# Patient Record
Sex: FEMALE | Race: WHITE
[De-identification: names, ages, dates, MRNs, and addresses within clinical notes are randomized per-mention and may not be internally consistent; named-entity substitution may affect disease eponyms.]

---
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
## 2024-12-13 ENCOUNTER — HOSPITAL ENCOUNTER (EMERGENCY)
Dept: HOSPITAL 33 - ED | Age: 24
Discharge: HOME | End: 2024-12-13
Payer: COMMERCIAL

## 2024-12-13 VITALS — SYSTOLIC BLOOD PRESSURE: 115 MMHG | DIASTOLIC BLOOD PRESSURE: 92 MMHG | OXYGEN SATURATION: 96 % | HEART RATE: 108 BPM

## 2024-12-13 VITALS — RESPIRATION RATE: 18 BRPM | TEMPERATURE: 98 F
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
## 2024-12-13 DIAGNOSIS — S61.511A: Primary | ICD-10-CM

## 2024-12-13 PROCEDURE — 12001 RPR S/N/AX/GEN/TRNK 2.5CM/<: CPT

## 2024-12-13 PROCEDURE — 99283 EMERGENCY DEPT VISIT LOW MDM: CPT

## 2024-12-13 RX ADMIN — BACITRACIN ZINC ONE EACH: 500 OINTMENT TOPICAL at 18:19

## 2024-12-13 NOTE — ERPHSYRPT
- History of Present Illness


Time Seen by Provider: 12/13/24 17:55


Source: patient


Exam Limitations: no limitations


Physician History: 





This is a 24-year-old white female patient who arrives by public transportation 

and is a patient of Dr. Jackson and is right-handed.  Patient fell after slipping 

on a plastic mat use for dog food bowl.  When she fell she hit the mat the mat 

broke in half and cut the dorsal aspect of her right wrist.  Patient tetanus 

status is up-to-date.  Patient has no known drug allergies and she takes no 

medications chronically.


Timing/Duration: today


Quality: painful


Severity: mild


Location: extremities (Right wrist dorsal aspect)


Associated Symptoms: denies symptoms


Allergies/Adverse Reactions: 








latex Allergy (Verified 12/13/24 18:01)


   Swelling





Home Medications: 








Norethindrone AC-Eth Estradiol [[place]ton 1.5 mg-30 Mcg Tablet] 1 each PO DAILY 

12/13/24 [History]


Paroxetine HCl 20 mg*** [Paxil 20 MG***] 20 mg PO DAILY 12/13/24 [History]





Hx Tetanus, Diphtheria Vaccination/Date Given: Yes


Hx Influenza Vaccination/Date Given: No


Hx Pneumococcal Vaccination/Date Given: No





Travel Risk





- International Travel


Have you traveled outside of the country in past 3 weeks: No





- Emerging Infectious Disease


Are you exhibiting symptoms associated with any current EIDs: No





- Review of Systems


Constitutional: No Symptoms


Eyes: No Symptoms


Ears, Nose, & Throat: No Symptoms


Respiratory: No Symptoms


Cardiac: No Symptoms


Abdominal/Gastrointestinal: No Symptoms


Genitourinary Symptoms: No Symptoms


Musculoskeletal: No Symptoms


Skin: Other (Right wrist skin laceration)


Neurological: No Symptoms


Psychological: No Symptoms


Endocrine: No Symptoms


Hematologic/Lymphatic: No Symptoms


Immunological/Allergic: No Symptoms


All Other Systems: Reviewed and Negative





- Past Medical History


Pertinent Past Medical History: No


Neurological History: Migraines


ENT History: No Pertinent History


Cardiac History: No Pertinent History


Respiratory History: No Pertinent History


Endocrine Medical History: No Pertinent History


Musculoskeletal History: No Pertinent History


GI Medical History: No Pertinent History


 History: No Pertinent History


Psycho-Social History: No Pertinent History


Female Reproductive Disorders: No Pertinent History





- Past Surgical History


Past Surgical History: Yes


Other Surgical History: TONSILECTOMY/ADNOIDECTOMY


Significant Family History: no pertinent family hx





- Female History


Hx Last Menstrual Period: 1ST OCT 2013





- Social History


Smoking Status: Never smoker


Exposure to second hand smoke: No


Drug Use: none


Patient Lives Alone: No





- Nursing Vital Signs


Nursing Vital Signs: 


                               Initial Vital Signs











Temperature  98 F   12/13/24 17:54


 


Pulse Rate  105 H  12/13/24 17:54


 


Respiratory Rate  18   12/13/24 17:54


 


Blood Pressure  147/124   12/13/24 17:54


 


O2 Sat by Pulse Oximetry  98   12/13/24 17:54








                                   Pain Scale











Pain Intensity                 5

















- Physical Exam


General Appearance: no apparent distress, alert, anxiety, obese


Eye Exam: PERRL/EOMI, eyes nml inspection


Ears, Nose, Throat Exam: normal ENT inspection


Neck Exam: normal inspection, non-tender, supple, full range of motion


Respiratory Exam: airway intact, No chest tenderness, No respiratory distress


Gastrointestinal/Abdomen Exam: No tenderness


Pelvic Exam: not done


Rectal Exam: not done


Back Exam: normal inspection, normal range of motion, No CVA tenderness, No 

vertebral tenderness


Extremity Exam: normal range of motion, pelvis stable, lacerations (1 cm skin 

laceration dorsal aspect right wrist.  No active bleeding.  No evidence of 

foreign body)


Neurologic Exam: alert, oriented x 3, cooperative, CNs II-XII nml as tested, nml

 cerebellar function, nml station & gait, sensation nml


Skin Exam: normal color, warm, dry, laceration (See above extremity section)


Lymphatic Exam: No adenopathy


**SpO2 Interpretation**: normal


O2 Delivery: Room Air





Procedures





- Laceration/Wound Repair


  ** Right Dorsal Wrist


Time of Procedure: 18:15


Wound Location: Right, wrist (Dorsal aspect)


Wound Length (cm): 1


Wound's Depth, Shape: superficial, linear


Wound Explored: clean (Wound explored to the base.  No active bleeding.  No 

foreign body noted)


Irrigated: Yes


Hibiclens Prep: Yes


Wound Repaired With: Staples


Progress: 





12/13/24 18:31


Patient tolerated the procedure well.  After laceration repair, the site was 

again cleaned with Hibiclens solution, dried and a thin layer of bacitracin 

ointment was applied.  A Band-Aid was placed overlying this repair site.





- Course


Nursing assessment & vital signs reviewed: Yes


Ordered Tests: 


                               Active Orders 24 hr











 Category Date Time Status


 


 Sutures STAT Care  12/13/24 18:18 Active


 


 Wound Care STAT Care  12/13/24 18:18 Active








Medication Summary














Discontinued Medications














Generic Name Dose Route Start Last Admin





  Trade Name Larry  PRN Reason Stop Dose Admin


 


Bacitracin Zinc  0.9 each  12/13/24 18:18  12/13/24 18:19





  Bacitracin Packet 1 Each Pckt  TP  12/13/24 18:19  0.9 each





  STAT ONE   Administration


 


Bacitracin Zinc  Confirm  12/13/24 18:16 





  Bacitracin Packet 1 Each Pckt  Administered  12/13/24 18:17 





  Dose  





  1 each  





  .ROUTE  





  .STK-MED ONE  














- Progress


Progress: improved


Progress Note: 





12/13/24 18:31


My medical decision making and the assignment of low complexity to this 

patient's medical issue today is based on review of the patient's past medical 

history, review of the patient's medication list, reviewed patient drug allergy 

list, history present illness and physical findings on examination.  The workup 

does not require any laboratory radiographic studies.





Differential diagnosis includes but is not limited to left skin laceration


Counseled pt/family regarding: diagnosis, need for follow-up





Medical Desision Making





- Diagnostic Testing


Diagnostic test were ordered, analyzed, and reviewed by me: No





- Risk of complications


Minimal Risk: Minimal risk of morbidity





- Departure


Departure Disposition: Home


Clinical Impression: 


 Laceration of skin of right wrist





Condition: Stable


Critical Care Time: No


Referrals: 


RADHA JACKSON MD [Primary Care Provider] - Follow up/PCP as directed


Additional Instructions: 


Use Tylenol and ibuprofen for pain control.  Keep the current bandage in place 

for 24 hours.  After 24 hours, remove the current bandage and rinse the site off

with soapy water.  Blot dry or use a hair dryer then apply a thin layer of 

antibiotic ointment of choice and again cover with Band-Aid/bandage.  Suture 

removal in 7 days. Is this okay to place?

## 2025-02-01 ENCOUNTER — HOSPITAL ENCOUNTER (EMERGENCY)
Dept: HOSPITAL 33 - ED | Age: 25
LOS: 1 days | Discharge: HOME | End: 2025-02-02
Payer: COMMERCIAL

## 2025-02-01 VITALS — TEMPERATURE: 96.8 F

## 2025-02-01 DIAGNOSIS — M54.50: ICD-10-CM

## 2025-02-01 DIAGNOSIS — R10.30: ICD-10-CM

## 2025-02-01 DIAGNOSIS — Z79.899: ICD-10-CM

## 2025-02-01 DIAGNOSIS — N13.2: Primary | ICD-10-CM

## 2025-02-01 DIAGNOSIS — Z79.891: ICD-10-CM

## 2025-02-01 DIAGNOSIS — N39.0: ICD-10-CM

## 2025-02-01 DIAGNOSIS — R11.0: ICD-10-CM

## 2025-02-01 LAB
ALBUMIN SERPL-MCNC: 4.9 G/DL (ref 3.5–5)
ALP SERPL-CCNC: 107 U/L (ref 38–126)
ALT SERPL-CCNC: 26 U/L (ref 0–35)
AMYLASE SERPL-CCNC: 66 U/L (ref 30–110)
ANION GAP SERPL CALC-SCNC: 16.1 MEQ/L (ref 5–15)
AST SERPL QL: 29 U/L (ref 14–36)
B-HCG SERPL QL: NEGATIVE
BASOPHILS # BLD AUTO: 0.08 X10^3/UL (ref 0.01–0.08)
BASOPHILS NFR BLD AUTO: 0.4 % (ref 0.1–1.2)
BILIRUB BLD-MCNC: 0.4 MG/DL (ref 0.2–1.3)
BUN SERPL-MCNC: 10 MG/DL (ref 7–17)
CALCIUM SPEC-MCNC: 9.6 MG/DL (ref 8.4–10.2)
CHLORIDE SERPL-SCNC: 107 MMOL/L (ref 98–107)
CO2 SERPL-SCNC: 19 MMOL/L (ref 22–30)
CREAT SERPL-MCNC: 0.78 MG/DL (ref 0.52–1.04)
EOSINOPHIL # BLD AUTO: 0.2 X10^3/UL (ref 0.04–0.36)
GFR SERPLBLD BASED ON 1.73 SQ M-ARVRAT: 108.7 ML/MIN
GLUCOSE SERPL-MCNC: 122 MG/DL (ref 74–106)
HCT VFR BLD AUTO: 42.7 % (ref 34.1–44.9)
HGB BLD-MCNC: 14.2 G/DL (ref 11.2–15.7)
IMM GRANULOCYTES # BLD: 0.1 X10^3U/L (ref 0–0.03)
IMM GRANULOCYTES NFR BLD: 0.5 % (ref 0–0.43)
LIPASE SERPL-CCNC: 48 U/L (ref 23–300)
LYMPHOCYTES # SPEC AUTO: 2.69 X10^3/UL (ref 1.18–3.74)
MCH RBC QN AUTO: 28.8 PG (ref 25.6–32.2)
MCHC RBC AUTO-ENTMCNC: 33.3 G/DL (ref 32.2–35.5)
MONOCYTES # BLD AUTO: 0.84 X10^3/UL (ref 0.24–0.86)
NRBC # BLD AUTO: 0 X10^3U/L (ref 0–0.01)
NRBC BLD AUTO-RTO: 0 % (ref 0–0.2)
PLATELET # BLD AUTO: 427 X10^3/UL (ref 182–369)
POTASSIUM SERPLBLD-SCNC: 4 MMOL/L (ref 3.5–5.1)
PROT SERPL-MCNC: 8.1 G/DL (ref 6.3–8.2)
PROT UR STRIP-MCNC: 30 MG/DL
RBC # BLD AUTO: 4.93 X10^6/UL (ref 3.93–5.22)
RBC # URNS HPF: (no result) /HPF (ref 0–5)
SODIUM SERPL-SCNC: 138 MMOL/L (ref 135–145)
WBC # BLD AUTO: 18.8 X10^3/UL (ref 3.98–10.04)

## 2025-02-01 PROCEDURE — 84703 CHORIONIC GONADOTROPIN ASSAY: CPT

## 2025-02-01 PROCEDURE — 99285 EMERGENCY DEPT VISIT HI MDM: CPT

## 2025-02-01 PROCEDURE — 76376 3D RENDER W/INTRP POSTPROCES: CPT

## 2025-02-01 PROCEDURE — 83690 ASSAY OF LIPASE: CPT

## 2025-02-01 PROCEDURE — 74176 CT ABD & PELVIS W/O CONTRAST: CPT

## 2025-02-01 PROCEDURE — 96376 TX/PRO/DX INJ SAME DRUG ADON: CPT

## 2025-02-01 PROCEDURE — 96375 TX/PRO/DX INJ NEW DRUG ADDON: CPT

## 2025-02-01 PROCEDURE — 85025 COMPLETE CBC W/AUTO DIFF WBC: CPT

## 2025-02-01 PROCEDURE — 81001 URINALYSIS AUTO W/SCOPE: CPT

## 2025-02-01 PROCEDURE — 99284 EMERGENCY DEPT VISIT MOD MDM: CPT

## 2025-02-01 PROCEDURE — 83605 ASSAY OF LACTIC ACID: CPT

## 2025-02-01 PROCEDURE — 80053 COMPREHEN METABOLIC PANEL: CPT

## 2025-02-01 PROCEDURE — 96374 THER/PROPH/DIAG INJ IV PUSH: CPT

## 2025-02-01 PROCEDURE — 36415 COLL VENOUS BLD VENIPUNCTURE: CPT

## 2025-02-01 PROCEDURE — 82150 ASSAY OF AMYLASE: CPT

## 2025-02-01 PROCEDURE — 87086 URINE CULTURE/COLONY COUNT: CPT

## 2025-02-01 RX ADMIN — CEFTRIAXONE SODIUM ONE MLS/HR: 1 INJECTION, POWDER, FOR SOLUTION INTRAMUSCULAR; INTRAVENOUS at 23:15

## 2025-02-01 RX ADMIN — HYDROMORPHONE HYDROCHLORIDE ONE MG: 1 INJECTION, SOLUTION INTRAMUSCULAR; INTRAVENOUS; SUBCUTANEOUS at 18:52

## 2025-02-01 RX ADMIN — HYDROMORPHONE HYDROCHLORIDE ONE MG: 1 INJECTION, SOLUTION INTRAMUSCULAR; INTRAVENOUS; SUBCUTANEOUS at 23:08

## 2025-02-01 RX ADMIN — ONDANSETRON ONE MG: 2 INJECTION, SOLUTION INTRAMUSCULAR; INTRAVENOUS at 18:51

## 2025-02-01 NOTE — ERPHSYRPT
- History of Present Illness


Time Seen by Provider: 02/01/25 18:13


Source: patient, family


Exam Limitations: no limitations


Physician History: 





Pt had onset of    9 out of 10 mid low lumbar and sacral pain with nausea and 

some radiation to low abd.  No Hx trauma, No blood thinners, some 

nausea/vomiting. No hx cancer no fever, no bowel or bladder symptoms although 

some blood with stools - no pain. No vag dc. 





Nontender abd without peritoneal signs. Chest clear. Ht reg without M. 


tender posterior sacrum midline and lower lumbar. 





  


Discussed with pt and available family risks and benefits of testing/Tx 

including  CBC, CMP, HCG,  UA, Amylase, Lipase,  CT abd and spinal 

reconstruction     ,   pain med dilaudid,  and they wish to proceed so these are

ordered.  








 Results discussed with pt and available family. 





Timing/Duration: today


Method of Injury: other (no injury reported)


Quality: sharp, stabbing


Back Pain Location: lumbar spine


Severity of Pain-Max: moderate


Severity of Pain-Current: moderate


Modifying Factors: Improves With: movement, pain medication


Associated Symptoms: nausea, vomiting, other (blood with bowel movements)


Previous symptoms: no prior history


Allergies/Adverse Reactions: 








latex Allergy (Verified 12/13/24 18:01)


   Swelling





Home Medications: 








Norethindrone AC-Eth Estradiol [Layton 1.5 mg-30 Mcg Tablet] 1 each PO DAILY 

12/13/24 [History]


Paroxetine HCl 20 mg*** [Paxil 20 MG***] 20 mg PO DAILY 12/13/24 [History]





Hx Tetanus, Diphtheria Vaccination/Date Given: Yes


Hx Influenza Vaccination/Date Given: No


Hx Pneumococcal Vaccination/Date Given: No





Travel Risk





- Emerging Infectious Disease


Are you exhibiting symptoms associated with any current EIDs: No





- Review of Systems


Constitutional: No Fever, No Chills


Eyes: No Symptoms


Ears, Nose, & Throat: No Symptoms


Respiratory: No Cough, No Dyspnea


Cardiac: No Chest Pain, No Edema, No Syncope


Abdominal/Gastrointestinal: Nausea, Vomiting, No Abdominal Pain, No Diarrhea


Genitourinary Symptoms: No Dysuria


Musculoskeletal: Back Pain, No Neck Pain, No Fall, No Injury


Skin: No Symptoms, No Rash


Neurological: No Dizziness, No Focal Weakness, No Sensory Changes


Psychological: No Symptoms


Endocrine: No Symptoms


Hematologic/Lymphatic: No Symptoms


Immunological/Allergic: No Symptoms


All Other Systems: Reviewed and Negative





- Past Medical History


Pertinent Past Medical History: No


Neurological History: Migraines


ENT History: No Pertinent History


Cardiac History: No Pertinent History


Respiratory History: No Pertinent History


Endocrine Medical History: No Pertinent History


Musculoskeletal History: No Pertinent History


GI Medical History: No Pertinent History


 History: No Pertinent History


Psycho-Social History: No Pertinent History


Female Reproductive Disorders: No Pertinent History


Other Medical History: UTI





- Past Surgical History


Past Surgical History: Yes


Other Surgical History: TONSILECTOMY/ADNOIDECTOMY


Significant Family History: no pertinent family hx





- Female History


Hx Last Menstrual Period: 1ST OCT 2013





- Social History


Smoking Status: Never smoker


Exposure to second hand smoke: No


Drug Use: none


Patient Lives Alone: No





- Social Determinants of Health


Will the patient participate in the screening: Declined to provide





- Nursing Vital Signs


Nursing Vital Signs: 


                               Initial Vital Signs











Temperature  96.8 F   02/01/25 18:20


 


Pulse Rate  116 H  02/01/25 18:20


 


Respiratory Rate  18   02/01/25 18:20


 


Blood Pressure  173/113   02/01/25 18:20


 


O2 Sat by Pulse Oximetry  99   02/01/25 18:20








                                   Pain Scale











Pain Intensity []              8


 


Pain Intensity                 5

















- Physical Exam


General Appearance: no apparent distress, alert


Eye Exam: PERRL/EOMI, eyes nml inspection


Neck Exam: normal inspection, non-tender, supple, full range of motion, No 

meningismus, No midline tenderness


Respiratory Exam: normal breath sounds, lungs clear, No chest tenderness, No 

respiratory distress


Cardiovascular Exam: regular rate/rhythm, normal heart sounds


Gastrointestinal Exam: soft, No tenderness, No distention, No mass, No guarding,

 No pulsatile mass, No rebound


Pelvic Exam: deferred


Rectal Exam: deferred


Back Exam: normal inspection, vertebral tenderness, point tenderness


Extremity Exam: normal inspection, normal range of motion, No calf tenderness, 

No pedal edema


Peripheral Pulses: carotid (R): 2+, carotid (L): 2+, femoral (R): 2+, femoral 

(L): 2+, dorsalis-pedis (R): 2+, dorsalis-pedis (L): 2+


Neurologic Exam: alert, oriented x 3, cooperative, CNs II-XII nml as tested, 

normal mood/affect, nml station & gait, sensation nml, No motor deficits


Skin Exam: normal color, warm, dry, No rash


**SpO2 Interpretation**: normal


SpO2: 99


O2 Delivery: Room Air





- Course


Nursing assessment & vital signs reviewed: Yes





- CT Exams


  ** Abdomen/Pelvis


CT Interpretation: Tele-radiologist Report, Other (left renal stone and mild hyd

ro. )


Ordered Tests: 


                               Active Orders 24 hr











 Category Date Time Status


 


 IV Insertion STAT Care  02/01/25 18:36 Active


 


 ABDOMEN AND PELVIS W/0 CONTRAS [CT] Stat Exams  02/01/25 18:35 Completed


 


 RECONSTRUCTION [CT] Stat Exams  02/01/25 19:08 Completed


 


 AMYLASE Stat Lab  02/01/25 18:45 Completed


 


 CBC W DIFF Stat Lab  02/01/25 18:45 Completed


 


 CMP Stat Lab  02/01/25 18:45 Completed


 


 CULTURE,URINE Stat Lab  02/01/25 19:01 Received


 


 HCG QUALITATIVE, SERUM Stat Lab  02/01/25 18:45 Completed


 


 LIPASE Stat Lab  02/01/25 18:45 Completed


 


 Lactic Acid Stat Lab  02/01/25 18:45 Completed


 


 UA W/RFX UR CULTURE Stat Lab  02/01/25 19:01 Completed








Medication Summary














Discontinued Medications














Generic Name Dose Route Start Last Admin





  Trade Name Larry  PRN Reason Stop Dose Admin


 


Hydromorphone HCl  1 mg  02/01/25 18:36  02/01/25 18:52





  Hydromorphone 1 Mg/1ml Inj***  IV  02/01/25 18:37  1 mg





  STAT ONE   Administration


 


Hydromorphone HCl  Confirm  02/01/25 18:47 





  Hydromorphone 1 Mg/1ml Inj***  Administered  02/01/25 18:48 





  Dose  





  1 mg  





  .ROUTE  





  .STK-MED ONE  


 


Hydromorphone HCl  1 mg  02/01/25 22:44  02/01/25 23:08





  Hydromorphone 1 Mg/1ml Inj***  IV  02/01/25 22:45  1 mg





  STAT ONE   Administration


 


Hydromorphone HCl  Confirm  02/01/25 23:05 





  Hydromorphone 1 Mg/1ml Inj***  Administered  02/01/25 23:06 





  Dose  





  1 mg  





  .ROUTE  





  .STK-MED ONE  


 


Ceftriaxone Sodium  1 gm in 100 mls @ 200 mls/hr  02/01/25 22:12  02/01/25 23:15





  Rocephin 1 Gm / 100 Ml Nacl  IV  02/01/25 22:41  200 mls/hr





  STAT ONE   200 mls/hr





    Administration


 


Ceftriaxone Sodium  Confirm  02/01/25 23:05 





  Rocephin 1 Gm / 100 Ml Nacl  Administered  02/01/25 23:06 





  Dose  





  1 gm in 100 mls @ ud  





  IV  





  .STK-MED ONE  


 


Ondansetron HCl  4 mg  02/01/25 18:36  02/01/25 18:51





  Ondansetron Hcl 4 Mg/2 Ml Vial  IV  02/01/25 18:37  4 mg





  STAT ONE   Administration


 


Ondansetron HCl  Confirm  02/01/25 18:47 





  Ondansetron Hcl 4 Mg/2 Ml Vial  Administered  02/01/25 18:48 





  Dose  





  4 mg  





  .ROUTE  





  .STK-MED ONE  











Lab/Rad Data: 


                           Laboratory Result Diagrams





                                 02/01/25 18:45 





                                 02/01/25 18:45 





                               Laboratory Results











  02/01/25 02/01/25 02/01/25 Range/Units





  19:01 18:45 18:45 


 


WBC     (3.98-10.04)  x10^3/uL


 


RBC     (3.93-5.22)  x10^6/uL


 


Hgb     (11.2-15.7)  g/dL


 


Hct     (34.1-44.9)  %


 


MCV     (79.4-94.8)  fL


 


MCH     (25.6-32.2)  pg


 


MCHC     (32.2-35.5)  g/dL


 


RDW     (11.7-14.4)  %


 


Plt Count     (182-369)  x10^3/uL


 


MPV     (9.4-12.3)  fL


 


Gran %     (34.0-71.1)  %


 


Immature Gran % (Auto)     (0.001-0.429)  %


 


Nucleat RBC Rel Count     (0.00-0.2)  %


 


Eos # (Auto)     (0.04-0.36)  x10^3/uL


 


Immature Gran # (Auto)     (0.001-0.031)  x10^3u/L


 


Absolute Lymphs (auto)     (1.18-3.74)  x10^3/uL


 


Absolute Monos (auto)     (0.24-0.86)  x10^3/uL


 


Absolute Nucleated RBC     (0.00-0.012)  x10^3u/L


 


Lymphocytes %     (19.3-51.7)  %


 


Monocytes %     (4.7-12.5)  %


 


Eosinophils %     (0.7-5.8)  %


 


Basophils %     (0.1-1.2)  %


 


Absolute Granulocytes     (1.56-6.13)  x10^3/uL


 


Basophils #     (0.01-0.08)  x10^3/uL


 


Sodium    138  (135-145)  mmol/L


 


Potassium    4.0  (3.5-5.1)  mmol/L


 


Chloride    107  ()  mmol/L


 


Carbon Dioxide    19 L  (22-30)  mmol/L


 


Anion Gap    16.1 H  (5-15)  MEQ/L


 


BUN    10  (7-17)  mg/dL


 


Creatinine    0.78  (0.52-1.04)  mg/dL


 


Estimated GFR    108.7  ML/MIN


 


Glucose    122 H  ()  mg/dL


 


Lactic Acid     (0.4-2.0)  


 


Calcium    9.6  (8.4-10.2)  mg/dL


 


Total Bilirubin    0.40  (0.2-1.3)  mg/dL


 


AST    29  (14-36)  U/L


 


ALT    26  (0-35)  U/L


 


Alkaline Phosphatase    107  ()  U/L


 


Serum Total Protein    8.1  (6.3-8.2)  g/dL


 


Albumin    4.9  (3.5-5.0)  g/dL


 


Amylase    66  ()  U/L


 


Lipase    48  ()  U/L


 


Serum HCG, Qual   NEGATIVE   (NEGATIVE)  


 


Urine Color  Yellow    (Yellow)  


 


Urine Appearance  Clear    (Clear)  


 


Urine pH  6.0    (4.6-8.0)  


 


Ur Specific Gravity  1.025    (1.005-1.030)  


 


Urine Protein  30    (Negative)  


 


Urine Glucose (UA)  Negative    (Negative)  mg/dL


 


Urine Ketones  15 A    (Negative)  


 


Urine Blood  Negative    (Negative)  


 


Urine Nitrite  Negative    (Negative)  


 


Urine Bilirubin  Negative    (Negative)  


 


Urine Urobilinogen  0.2    (0.2)  mg/dL


 


Ur Leukocyte Esterase  Negative    (Negative)  


 


U Hyaline Cast (Auto)  NONE SEEN    (0-2)  /LPF


 


Urine Microscopic RBC  3-5    (0-5)  /HPF


 


Urine Microscopic WBC  11-20 A    (0-5)  /HPF


 


Ur Epithelial Cells  Few    (None Seen)  /HPF


 


Urine Bacteria  Few A    (None Seen)  /HPF


 


Urine Culture Reflexed  YES    (NO)  














  02/01/25 02/01/25 Range/Units





  18:45 18:45 


 


WBC   18.8 H  (3.98-10.04)  x10^3/uL


 


RBC   4.93  (3.93-5.22)  x10^6/uL


 


Hgb   14.2  (11.2-15.7)  g/dL


 


Hct   42.7  (34.1-44.9)  %


 


MCV   86.6  (79.4-94.8)  fL


 


MCH   28.8  (25.6-32.2)  pg


 


MCHC   33.3  (32.2-35.5)  g/dL


 


RDW   13.7  (11.7-14.4)  %


 


Plt Count   427 H  (182-369)  x10^3/uL


 


MPV   9.6  (9.4-12.3)  fL


 


Gran %   79.2 H  (34.0-71.1)  %


 


Immature Gran % (Auto)   0.5 H  (0.001-0.429)  %


 


Nucleat RBC Rel Count   0.0  (0.00-0.2)  %


 


Eos # (Auto)   0.20  (0.04-0.36)  x10^3/uL


 


Immature Gran # (Auto)   0.10 H  (0.001-0.031)  x10^3u/L


 


Absolute Lymphs (auto)   2.69  (1.18-3.74)  x10^3/uL


 


Absolute Monos (auto)   0.84  (0.24-0.86)  x10^3/uL


 


Absolute Nucleated RBC   0.00  (0.00-0.012)  x10^3u/L


 


Lymphocytes %   14.3 L  (19.3-51.7)  %


 


Monocytes %   4.5 L  (4.7-12.5)  %


 


Eosinophils %   1.1  (0.7-5.8)  %


 


Basophils %   0.4  (0.1-1.2)  %


 


Absolute Granulocytes   14.84 H  (1.56-6.13)  x10^3/uL


 


Basophils #   0.08  (0.01-0.08)  x10^3/uL


 


Sodium    (135-145)  mmol/L


 


Potassium    (3.5-5.1)  mmol/L


 


Chloride    ()  mmol/L


 


Carbon Dioxide    (22-30)  mmol/L


 


Anion Gap    (5-15)  MEQ/L


 


BUN    (7-17)  mg/dL


 


Creatinine    (0.52-1.04)  mg/dL


 


Estimated GFR    ML/MIN


 


Glucose    ()  mg/dL


 


Lactic Acid  1.9   (0.4-2.0)  


 


Calcium    (8.4-10.2)  mg/dL


 


Total Bilirubin    (0.2-1.3)  mg/dL


 


AST    (14-36)  U/L


 


ALT    (0-35)  U/L


 


Alkaline Phosphatase    ()  U/L


 


Serum Total Protein    (6.3-8.2)  g/dL


 


Albumin    (3.5-5.0)  g/dL


 


Amylase    ()  U/L


 


Lipase    ()  U/L


 


Serum HCG, Qual    (NEGATIVE)  


 


Urine Color    (Yellow)  


 


Urine Appearance    (Clear)  


 


Urine pH    (4.6-8.0)  


 


Ur Specific Gravity    (1.005-1.030)  


 


Urine Protein    (Negative)  


 


Urine Glucose (UA)    (Negative)  mg/dL


 


Urine Ketones    (Negative)  


 


Urine Blood    (Negative)  


 


Urine Nitrite    (Negative)  


 


Urine Bilirubin    (Negative)  


 


Urine Urobilinogen    (0.2)  mg/dL


 


Ur Leukocyte Esterase    (Negative)  


 


U Hyaline Cast (Auto)    (0-2)  /LPF


 


Urine Microscopic RBC    (0-5)  /HPF


 


Urine Microscopic WBC    (0-5)  /HPF


 


Ur Epithelial Cells    (None Seen)  /HPF


 


Urine Bacteria    (None Seen)  /HPF


 


Urine Culture Reflexed    (NO)  














- Progress


Progress: improved, re-examined


Progress Note: 





02/01/25 23:47


pain down from 9 to 0 now.





I discussed with pt and mom that this pain may not necessarily be related to the

 stone, and that there could be additional pathology evolving undetected 

incuding infection, vascular, abdominal or other.  they are aware, and wish to 

have outpt f/u rather than furhter eval in ER or hospital and have the cqapcity 

to make this choice. 


Counseled pt/family regarding: lab results, diagnosis, need for follow-up, rad 

results





Medical Desision Making





- Independent Historian


Additional History obtained from: Family





- Discussion of managment


Reviewed:: Test results, Need for additional workup


Agreed on:: Treatment plan, need for follow-up





- Diagnostic Testing


Diagnostic test were ordered, analyzed, and reviewed by me: Yes


Radiological Interpretation: Teleradiologist Report





- Risk of complications


The pt has a mod risk of morbidity or mortality based on: Need for prescription 

drug management


The pt has a high risk of morbidity or mortality based on: Decision regarding 

hospitilization or escalation of hosp level of care





- Departure


Departure Disposition: Home


Clinical Impression: 


 Renal calculus, UTI (urinary tract infection)





Condition: Good


Critical Care Time: No


Referrals: 


RADHA JACKSON MD [Primary Care Provider] - Follow up/PCP as directed


Instructions:  Low Back Pain  (DC), Kidney stones in adults - ED discharge 

instructions, Urinary tract infection in adults - ED discharge instructions, 

Abdominal pain in adults - ED discharge instructions


Additional Instructions: 


Although there is a kidney stone which seems a likely source for your pain, we 

have not yet determined the exact cause, and there still could be other 

undetected conditions developing - so followup with your DrOphelia and referral to a 

urologist are important. Return meantime if not improving, vomiting, fever, 

dizziness or any othe4r symptoms of concern. 


also followup your blood pressure with your Dr. 


strain the urine for the stones.


Prescriptions: 


Hydrocodone/Acetaminophen [Hydrocodone-Acetamin 5-325 mg***] 1 tab PO Q6HPRN PRN

#14 tablet MDD 4


 PRN Reason: Pain


Cephalexin Mh 500 mg** [Keflex 500 mg**] 500 mg PO TID 7 Days #21 cap

## 2025-02-01 NOTE — XRAY
CLINICAL HISTORY: PAIN

COMPARISON: No prior studies are available for comparison.

TECHNIQUE: CT non-contrast scan of lumbar spine done. Axial images obtained

with reformatted coronal and sagittal images and submitted for interpretation.

One of the following dose reduction techniques were utilized for this exam:

Automated exposure control, adjustment of the mA and/or kV according to

patient size, use of iterative reconstruction.

FINDINGS:

Vertebrae:

 Straightening of the lumbar spine possibly due to muscular spasm.

No fractures, lytic or sclerotic lesions.

Normal bone density without evidence of osteopenia or osteoporosis.



Intervertebral Discs:

 Normal height and signal intensity of the intervertebral discs.

No evidence of disc herniation, bulging, or significant degeneration.



Spinal Canal and Neural Foramina:

 Spinal canal is of normal caliber with no evidence of spinal stenosis.

Neural foramina are patent bilaterally at all levels.

No evidence of nerve root compression.



Facet Joints:

 Normal appearance of the facet joints.

No evidence of facet arthropathy or significant degenerative changes.



Soft Tissues:

 Normal appearance of the paraspinal soft tissues.

No abnormal masses, fluid collections, or signs of inflammation.

 Left renal pelvic stone measuring about 1 cm with mild backpressure changes.

IMPRESSION:

1. Straightening of the lumbar spine possibly due to muscular spasm.

2. No fractures, dislocation, or significant degenerative changes.

3. Left renal pelvic stone measuring about 1 cm with mild backpressure

changes.



_____________________________________

Electronically Signed by: Ml Chi MD. (02/01/2025 21:46:05 EST)

## 2025-02-01 NOTE — XRAY
CLINICAL HISTORY: back and abd pain

COMPARISON: No prior studies available for comparison

TECHNIQUE: Non-contrast CT of the abdomen and pelvis was performed, with the

following protocol: axial images, and reconstructed coronal and sagittal

images. No intravenous contrast was administered. One of the following dose

reduction techniques was utilized for this exam: Automated exposure control,

adjustment of the mA and/or kV according to patient size, and use of iterative

reconstruction.

FINDINGS:

Abdomen:

 Liver:

 Enlarged in size measuring 19.7 cm in craniocaudal span with diffuse

hypodensity. No focal lesions, cysts, or masses were identified.



 Gallbladder and Biliary System:

 The gallbladder is normal in size and shape. No wall thickening,

pericholecystic fluid, or gallstones were identified.



 Pancreas:

 Pancreatic head, body, and tail are visualized and appear normal in size and

density. No pancreatic masses or calcifications were noted.



 Spleen:

 Normal in size, shape, and density. No splenic lesions or masses were

identified.



 Kidneys and Adrenal Glands:

 Both kidneys are normal in size, shape, and position. Cortical thickness is

within normal limits.

 Left renal pelvic stone seen measuring 1 x 0.7 cm with subsequent mild

hydronephrosis.

 Adrenal glands are unremarkable.



 Appendix:

 The appendix is normal in size without lee appendiceal fat stranding, and

without an appendicolith.

No evidence of appendiceal abscess or perforation.



 Pelvis:

 Urinary Bladder: Normal in contour and wall thickness. No intraluminal

lesions.

Uterus: Normal in size and contour. No masses or abnormal thickening.

Ovaries: Not well visualized but no gross abnormalities noted.

Vagina: Normal in contour and wall thickness.

Cervix: No evidence of mass or abnormal thickening.



 Peritoneal and Retroperitoneal Structures:

 No free fluid or abnormal fluid collections were identified within the

abdomen or pelvis. No lymphadenopathy was noted.



 Bowel:

 The visualized bowel loops are normal in caliber and appearance.

No evidence of bowel obstruction or wall thickening.



 Bones and Soft Tissues:

 Pelvic bones and soft tissues are unremarkable. No fractures or abnormal

masses were identified.

IMPRESSION:

Left renal pelvic stone seen measuring 1 x 0.7 cm with subsequent mild

hydronephrosis.

 Hepatosteatosis.



_____________________________________

Electronically Signed by: Ml Chi MD. (02/01/2025 21:44:11 EST)

## 2025-02-02 VITALS
SYSTOLIC BLOOD PRESSURE: 153 MMHG | HEART RATE: 98 BPM | RESPIRATION RATE: 18 BRPM | DIASTOLIC BLOOD PRESSURE: 107 MMHG | OXYGEN SATURATION: 96 %

## 2025-02-19 ENCOUNTER — HOSPITAL ENCOUNTER (EMERGENCY)
Dept: HOSPITAL 33 - ED | Age: 25
Discharge: HOME | End: 2025-02-19
Payer: COMMERCIAL

## 2025-02-19 VITALS — RESPIRATION RATE: 16 BRPM

## 2025-02-19 VITALS — OXYGEN SATURATION: 93 % | SYSTOLIC BLOOD PRESSURE: 113 MMHG | DIASTOLIC BLOOD PRESSURE: 76 MMHG | HEART RATE: 78 BPM

## 2025-02-19 VITALS — TEMPERATURE: 96.8 F

## 2025-02-19 DIAGNOSIS — Z79.891: ICD-10-CM

## 2025-02-19 DIAGNOSIS — N13.2: Primary | ICD-10-CM

## 2025-02-19 DIAGNOSIS — Z79.899: ICD-10-CM

## 2025-02-19 DIAGNOSIS — R10.9: ICD-10-CM

## 2025-02-19 LAB
ALBUMIN SERPL-MCNC: 4.3 G/DL (ref 3.5–5)
ALP SERPL-CCNC: 79 U/L (ref 38–126)
ALT SERPL-CCNC: 23 U/L (ref 0–35)
AMYLASE SERPL-CCNC: 53 U/L (ref 30–110)
ANION GAP SERPL CALC-SCNC: 16 MEQ/L (ref 5–15)
AST SERPL QL: 23 U/L (ref 14–36)
BASOPHILS # BLD AUTO: 0.05 X10^3/UL (ref 0.01–0.08)
BASOPHILS NFR BLD AUTO: 0.2 % (ref 0.1–1.2)
BILIRUB BLD-MCNC: 0.4 MG/DL (ref 0.2–1.3)
BUN SERPL-MCNC: 12 MG/DL (ref 7–17)
CALCIUM SPEC-MCNC: 9.3 MG/DL (ref 8.4–10.2)
CHLORIDE SERPL-SCNC: 103 MMOL/L (ref 98–107)
CO2 SERPL-SCNC: 24 MMOL/L (ref 22–30)
CREAT SERPL-MCNC: 0.79 MG/DL (ref 0.52–1.04)
EOSINOPHIL # BLD AUTO: 0.12 X10^3/UL (ref 0.04–0.36)
GFR SERPLBLD BASED ON 1.73 SQ M-ARVRAT: 107.1 ML/MIN
GLUCOSE SERPL-MCNC: 146 MG/DL (ref 74–106)
HCT VFR BLD AUTO: 38.3 % (ref 34.1–44.9)
HGB BLD-MCNC: 12.7 G/DL (ref 11.2–15.7)
IMM GRANULOCYTES # BLD: 0.11 X10^3U/L (ref 0–0.03)
IMM GRANULOCYTES NFR BLD: 0.5 % (ref 0–0.43)
LIPASE SERPL-CCNC: 22 U/L (ref 23–300)
LYMPHOCYTES # SPEC AUTO: 3.09 X10^3/UL (ref 1.18–3.74)
MCH RBC QN AUTO: 28.9 PG (ref 25.6–32.2)
MCHC RBC AUTO-ENTMCNC: 33.2 G/DL (ref 32.2–35.5)
MONOCYTES # BLD AUTO: 1.48 X10^3/UL (ref 0.24–0.86)
NRBC # BLD AUTO: 0 X10^3U/L (ref 0–0.01)
NRBC BLD AUTO-RTO: 0 % (ref 0–0.2)
PLATELET # BLD AUTO: 417 X10^3/UL (ref 182–369)
POTASSIUM SERPLBLD-SCNC: 4.2 MMOL/L (ref 3.5–5.1)
PROT SERPL-MCNC: 7 G/DL (ref 6.3–8.2)
PROT UR STRIP-MCNC: 30 MG/DL
RBC # BLD AUTO: 4.4 X10^6/UL (ref 3.93–5.22)
SODIUM SERPL-SCNC: 139 MMOL/L (ref 135–145)
WBC # BLD AUTO: 20.4 X10^3/UL (ref 3.98–10.04)
WBC URNS QL MICRO: (no result) /HPF (ref 0–5)

## 2025-02-19 PROCEDURE — 74176 CT ABD & PELVIS W/O CONTRAST: CPT

## 2025-02-19 PROCEDURE — 82150 ASSAY OF AMYLASE: CPT

## 2025-02-19 PROCEDURE — 85025 COMPLETE CBC W/AUTO DIFF WBC: CPT

## 2025-02-19 PROCEDURE — 83690 ASSAY OF LIPASE: CPT

## 2025-02-19 PROCEDURE — 99284 EMERGENCY DEPT VISIT MOD MDM: CPT

## 2025-02-19 PROCEDURE — 81001 URINALYSIS AUTO W/SCOPE: CPT

## 2025-02-19 PROCEDURE — 99285 EMERGENCY DEPT VISIT HI MDM: CPT

## 2025-02-19 PROCEDURE — 96365 THER/PROPH/DIAG IV INF INIT: CPT

## 2025-02-19 PROCEDURE — 96361 HYDRATE IV INFUSION ADD-ON: CPT

## 2025-02-19 PROCEDURE — 80053 COMPREHEN METABOLIC PANEL: CPT

## 2025-02-19 PROCEDURE — 36415 COLL VENOUS BLD VENIPUNCTURE: CPT

## 2025-02-19 PROCEDURE — 96375 TX/PRO/DX INJ NEW DRUG ADDON: CPT

## 2025-02-19 PROCEDURE — 96374 THER/PROPH/DIAG INJ IV PUSH: CPT

## 2025-02-19 PROCEDURE — 87086 URINE CULTURE/COLONY COUNT: CPT

## 2025-02-19 RX ADMIN — KETOROLAC TROMETHAMINE ONE MG: 30 INJECTION, SOLUTION INTRAMUSCULAR; INTRAVENOUS at 16:39

## 2025-02-19 RX ADMIN — ONDANSETRON ONE MG: 2 INJECTION, SOLUTION INTRAMUSCULAR; INTRAVENOUS at 16:33

## 2025-02-19 RX ADMIN — HYDROMORPHONE HYDROCHLORIDE ONE MG: 1 INJECTION, SOLUTION INTRAMUSCULAR; INTRAVENOUS; SUBCUTANEOUS at 16:42

## 2025-02-19 RX ADMIN — CEFTRIAXONE SODIUM ONE MLS/HR: 1 INJECTION, POWDER, FOR SOLUTION INTRAMUSCULAR; INTRAVENOUS at 18:07

## 2025-02-19 RX ADMIN — HYDROCODONE BITARTRATE AND ACETAMINOPHEN ONE TAB: 5; 325 TABLET ORAL at 18:56

## 2025-02-19 NOTE — ERPHSYRPT
- History of Present Illness


Time Seen by Provider: 02/19/25 16:03


Source: patient


Exam Limitations: no limitations


Physician History: 





This is a 25-year-old white female patient of Dr. Jackson who arrives by private 

vehicle and was seen in our emergency department on 2/1/2025 and diagnosed with 

a renal stone and urinary tract infection as well as hydronephrosis.  A CT scan 

of the abdomen pelvis was performed on the same date which showed a left renal 

pelvic stone measuring 1 cm x 0.7 cm with mild hydronephrosis, normal 

appendicitis.  Patient was offered transfer to a outside facility but the 

patient desired outpatient follow-up with a urologist.  She did in fact see a 

urologist and patient underwent left renal calculus removal with left ureteral 

stent placement.  The ureteral stent on the left side was removed today at 

[facility] in [facility] at approximately 9:30 AM.  She reports, 

she was discharged to home and by 10:15 AM, the pain was returning in the left 

flank region.  Patient took ibuprofen which resolved her pain nearly completely.

 Within a few hours the pain in the left flank returned and she took Tylenol 

which did not help.  The cramping type pain in the left flank was worse and she 

arrives to the emergency department with left flank pain.  She is tearful.


Timing/Duration: today


Activites at Onset: none


Quality: cramping, stabbing


Onset Location: left flank


Pain Radiation: none


Severity of Pain-Max: moderate


Severity of Pain-Current: moderate


Sexual intercourse history: non-contributory


Modifying Factors: Improves With: other (Ibuprofen seem to help.)


Associated Symptoms: denies symptoms


Allergies/Adverse Reactions: 








latex Allergy (Verified 02/19/25 16:09)


   Swelling





Home Medications: 








Norethindrone AC-Eth Estradiol [Layton 1.5 mg-30 Mcg Tablet] 1 each PO DAILY 

12/13/24 [History]


Paroxetine HCl 20 mg*** [Paxil 20 MG***] 20 mg PO DAILY 12/13/24 [History]


Ketorolac Trometh 10 mg Tab** [TORAdol 10 MG TABLET***] 10 mg PO BID 02/19/25 

[History]


Tamsulosin HCl 0.4 mg*** [Flomax 0.4 MG***] 0.4 mg PO DAILY 02/19/25 [History]





Hx Tetanus, Diphtheria Vaccination/Date Given: Yes


Hx Influenza Vaccination/Date Given: No


Hx Pneumococcal Vaccination/Date Given: No





Travel Risk





- International Travel


Have you traveled outside of the country in past 3 weeks: No





- Emerging Infectious Disease


Are you exhibiting symptoms associated with any current EIDs: No


Symptoms: Vomitting





- Review of Systems


Constitutional: No Symptoms


Eyes: No Symptoms


Ears, Nose, & Throat: No Symptoms


Respiratory: No Symptoms


Cardiac: No Symptoms


Abdominal/Gastrointestinal: No Symptoms


Genitourinary Symptoms: Flank Pain (Left side)


Musculoskeletal: No Symptoms


Skin: No Symptoms


Neurological: No Symptoms


Psychological: No Symptoms


Endocrine: No Symptoms


Hematologic/Lymphatic: No Symptoms


Immunological/Allergic: No Symptoms


All Other Systems: Reviewed and Negative





- Past Medical History


Pertinent Past Medical History: No


Neurological History: Migraines


ENT History: No Pertinent History


Cardiac History: No Pertinent History


Respiratory History: No Pertinent History


Endocrine Medical History: No Pertinent History


Musculoskeletal History: No Pertinent History


GI Medical History: No Pertinent History


 History: No Pertinent History


Psycho-Social History: No Pertinent History


Female Reproductive Disorders: No Pertinent History


Other Medical History: UTI





- Past Surgical History


Past Surgical History: Yes


Other Surgical History: TONSILECTOMY/ADNOIDECTOMY


Significant Family History: no pertinent family hx





- Female History


Hx Last Menstrual Period: 1ST OCT 2013





- Social History


Smoking Status: Never smoker


Exposure to second hand smoke: No


Drug Use: none


Patient Lives Alone: No





- Social Determinants of Health


Will the patient participate in the screening: Declined to provide





- Nursing Vital Signs


Nursing Vital Signs: 


                               Initial Vital Signs











Temperature  96.8 F   02/19/25 16:12


 


Pulse Rate  103 H  02/19/25 16:12


 


Respiratory Rate  18   02/19/25 16:12


 


Blood Pressure  151/95   02/19/25 16:12


 


O2 Sat by Pulse Oximetry  97   02/19/25 16:12








                                   Pain Scale











Pain Intensity                 1

















- Physical Exam


General Appearance: mild distress, alert, anxiety


Eye Exam: PERRL/EOMI, eyes nml inspection


Ears, Nose, Throat Exam: normal ENT inspection, moist mucous membranes


Neck Exam: normal inspection, non-tender, supple, full range of motion


Respiratory Exam: normal breath sounds, lungs clear, airway intact, No chest 

tenderness, No respiratory distress


Cardiovascular Exam: regular rate/rhythm, normal heart sounds, normal peripheral

pulses


Gastrointestinal/Abdomen Exam: soft, normal bowel sounds, No tenderness


Pelvic Exam: not done


Rectal Exam: not done


Back Exam: normal inspection, normal range of motion, CVA tenderness (Left side)


Extremity Exam: normal inspection, normal range of motion, pelvis stable


Neurologic Exam: alert, oriented x 3, cooperative, CNs II-XII nml as tested, 

normal mood/affect, nml cerebellar function, nml station & gait, sensation nml


Skin Exam: normal color, warm, dry


Lymphatic Exam: No adenopathy


**SpO2 Interpretation**: normal


O2 Delivery: Room Air





- Course


Nursing assessment & vital signs reviewed: Yes


Ordered Tests: 


                               Active Orders 24 hr











 Category Date Time Status


 


 IV Insertion STAT Care  02/19/25 16:24 Active


 


 ABDOMEN AND PELVIS W/0 CONTRAS [CT] Stat Exams  02/19/25 16:24 Taken


 


 AMYLASE Stat Lab  02/19/25 16:15 Completed


 


 CBC W DIFF Stat Lab  02/19/25 16:15 Completed


 


 CMP Stat Lab  02/19/25 16:15 Completed


 


 CULTURE,URINE Stat Lab  02/19/25 17:15 Received


 


 LIPASE Stat Lab  02/19/25 16:15 Completed


 


 UA W/RFX UR CULTURE Stat Lab  02/19/25 17:15 Completed








Medication Summary











Generic Name Dose Route Start Last Admin





  Trade Name Freq  PRN Reason Stop Dose Admin


 


Ceftriaxone Sodium  1 gm in 100 mls @ 200 mls/hr  02/19/25 17:57  02/19/25 18:07





  Rocephin 1 Gm / 100 Ml Nacl  IV  02/19/25 18:26  200 ml/hr





  STAT ONE   200 mls/hr





    Administration














Discontinued Medications














Generic Name Dose Route Start Last Admin





  Trade Name Freq  PRN Reason Stop Dose Admin


 


Hydromorphone HCl  1 mg  02/19/25 16:24  02/19/25 16:42





  Hydromorphone 1 Mg/1ml Inj***  IV  02/19/25 16:25  1 mg





  STAT ONE   Administration


 


Hydromorphone HCl  Confirm  02/19/25 16:31 





  Hydromorphone 1 Mg/1ml Inj***  Administered  02/19/25 16:32 





  Dose  





  1 mg  





  .ROUTE  





  .STK-MED ONE  


 


Sodium Chloride  1,000 mls @ 999 mls/hr  02/19/25 16:24  02/19/25 17:54





  Sodium Chloride 0.9% 1000 Ml  IV  02/19/25 17:24  Infused





  .Q1H1M STA   Infusion


 


Sodium Chloride  Confirm  02/19/25 16:31 





  Sodium Chloride 0.9% 1000 Ml  Administered  02/19/25 16:32 





  Dose  





  1,000 mls @ ud  





  .ROUTE  





  .STK-MED ONE  


 


Ceftriaxone Sodium  Confirm  02/19/25 18:05 





  Rocephin 1 Gm / 100 Ml Nacl  Administered  02/19/25 18:06 





  Dose  





  1 gm in 100 mls @ ud  





  IV  





  .STK-MED ONE  


 


Ketorolac Tromethamine  30 mg  02/19/25 16:24  02/19/25 16:39





  Ketorolac Tromethamine 30 Mg/Ml Inj  IV  02/19/25 16:25  30 mg





  STAT ONE   Administration


 


Ketorolac Tromethamine  Confirm  02/19/25 16:30 





  Ketorolac Tromethamine 30 Mg/Ml Inj  Administered  02/19/25 16:31 





  Dose  





  30 mg  





  .ROUTE  





  .STK-MED ONE  


 


Ondansetron HCl  4 mg  02/19/25 16:24  02/19/25 16:33





  Ondansetron Hcl 4 Mg/2 Ml Vial  IV  02/19/25 16:25  4 mg





  STAT ONE   Administration


 


Ondansetron HCl  Confirm  02/19/25 16:29 





  Ondansetron Hcl 4 Mg/2 Ml Vial  Administered  02/19/25 16:30 





  Dose  





  4 mg  





  .ROUTE  





  .STK-MED ONE  











Lab/Rad Data: 


                           Laboratory Result Diagrams





                                 02/19/25 16:15 





                                 02/19/25 16:15 





                               Laboratory Results











  02/19/25 02/19/25 02/19/25 Range/Units





  17:15 16:15 16:15 


 


WBC    20.4 H  (3.98-10.04)  x10^3/uL


 


RBC    4.40  (3.93-5.22)  x10^6/uL


 


Hgb    12.7  (11.2-15.7)  g/dL


 


Hct    38.3  (34.1-44.9)  %


 


MCV    87.0  (79.4-94.8)  fL


 


MCH    28.9  (25.6-32.2)  pg


 


MCHC    33.2  (32.2-35.5)  g/dL


 


RDW    13.9  (11.7-14.4)  %


 


Plt Count    417 H  (182-369)  x10^3/uL


 


MPV    9.3 L  (9.4-12.3)  fL


 


Gran %    76.4 H  (34.0-71.1)  %


 


Immature Gran % (Auto)    0.5 H  (0.001-0.429)  %


 


Nucleat RBC Rel Count    0.0  (0.00-0.2)  %


 


Eos # (Auto)    0.12  (0.04-0.36)  x10^3/uL


 


Immature Gran # (Auto)    0.11 H  (0.001-0.031)  x10^3u/L


 


Absolute Lymphs (auto)    3.09  (1.18-3.74)  x10^3/uL


 


Absolute Monos (auto)    1.48 H  (0.24-0.86)  x10^3/uL


 


Absolute Nucleated RBC    0.00  (0.00-0.012)  x10^3u/L


 


Lymphocytes %    15.1 L  (19.3-51.7)  %


 


Monocytes %    7.2  (4.7-12.5)  %


 


Eosinophils %    0.6 L  (0.7-5.8)  %


 


Basophils %    0.2  (0.1-1.2)  %


 


Absolute Granulocytes    15.58 H  (1.56-6.13)  x10^3/uL


 


Basophils #    0.05  (0.01-0.08)  x10^3/uL


 


Sodium   139   (135-145)  mmol/L


 


Potassium   4.2   (3.5-5.1)  mmol/L


 


Chloride   103   ()  mmol/L


 


Carbon Dioxide   24   (22-30)  mmol/L


 


Anion Gap   16.0 H   (5-15)  MEQ/L


 


BUN   12   (7-17)  mg/dL


 


Creatinine   0.79   (0.52-1.04)  mg/dL


 


Estimated GFR   107.1   ML/MIN


 


Glucose   146 H   ()  mg/dL


 


Calcium   9.3   (8.4-10.2)  mg/dL


 


Total Bilirubin   0.40   (0.2-1.3)  mg/dL


 


AST   23   (14-36)  U/L


 


ALT   23   (0-35)  U/L


 


Alkaline Phosphatase   79   ()  U/L


 


Serum Total Protein   7.0   (6.3-8.2)  g/dL


 


Albumin   4.3   (3.5-5.0)  g/dL


 


Amylase   53   ()  U/L


 


Lipase   22 L   ()  U/L


 


Urine Color  Yellow    (Yellow)  


 


Urine Appearance  Clear    (Clear)  


 


Urine pH  6.5    (4.6-8.0)  


 


Ur Specific Gravity  1.010    (1.005-1.030)  


 


Urine Protein  30    (Negative)  


 


Urine Glucose (UA)  Negative    (Negative)  mg/dL


 


Urine Ketones  Negative    (Negative)  


 


Urine Blood  Large A    (Negative)  


 


Urine Nitrite  Negative    (Negative)  


 


Urine Bilirubin  Negative    (Negative)  


 


Urine Urobilinogen  0.2    (0.2)  mg/dL


 


Ur Leukocyte Esterase  Moderate A    (Negative)  


 


U Hyaline Cast (Auto)  NONE SEEN    (0-2)  /LPF


 


Urine Microscopic RBC  11-20 A    (0-5)  /HPF


 


Urine Microscopic WBC  21-50 A    (0-5)  /HPF


 


Ur Epithelial Cells  Rare    (None Seen)  /HPF


 


Urine Bacteria  None Seen    (None Seen)  /HPF


 


Urine Culture Reflexed  YES    (NO)  














- Progress


Progress: improved, re-examined


Air Movement: good


Progress Note: 





02/19/25 16:52


My medical decision making of the assignment of moderate complexity to this 

patient's medical issue today is based on review of the patient's past medical 

history, review of the patient's medication list, review the patient drug 

allergy list, history present illness and physical findings on examination.  The

 workup in this patient includes placement of an intravenous line, infusion of 

normal saline solution, infusion of Dilaudid, infusion of Zofran, infusion of 

Toradol, CBC, CMP, amylase, lipase, CT scan of the abdomen pelvis without 

contrast.





Differential diagnosis includes but is not limited to postprocedure left renal 

and ureteral hydronephrosis, pyelonephritis


02/19/25 18:25


I interpreted the patient's laboratory data results.  Based on the laboratory 

data results, the patient has a leukocytosis of 20,000.  No other acute, 

emergent medical issues.





The CT scan without contrast was interpreted by the radiologist and I reviewed 

the impression.  The impression states compared to the similar study dated 

2/1/2025, there is now 3 fragments of the prior renal calculus present.  This is

 presumed to be present after lithotripsy.  There is tiny air bubbles, presumed 

iatrogenic, in the left kidney and urinary bladder.  There is mild left 

hydronephrosis and hydroureter.





Patient's pain is much better controlled.  I spoke with the patient's mother and

 they have not filled the Toradol prescription.


Blood Culture(s) Obtained: No


Antibiotics given: Yes


Counseled pt/family regarding: lab results, diagnosis, need for follow-up, rad 

results





Medical Desision Making





- Independent Historian


Additional History obtained from: Mother





- Diagnostic Testing


Diagnostic test were ordered, analyzed, and reviewed by me: Yes


Radiological Interpretation: Reviewed by me, Teleradiologist Report





- Risk of complications


The pt has a mod risk of morbidity or mortality based on: Need for prescription 

drug management





- Departure


Departure Disposition: Home


Clinical Impression: 


 Left flank pain, Kidney stone on left side





Condition: Stable


Critical Care Time: No


Referrals: 


RADHA JACKSON MD [Primary Care Provider] - Follow up/PCP as directed


Additional Instructions: 


Drink plenty of fluids.  Take your antibiotics as prescribed.  Fill the Toradol 

prescription today and take that as prescribed.  Call your urologist tomorrow 

morning, 2/20/2025, to obtain instructions on further management.  Tell him the 

large stone that was present on 2/1/2025, is now in 3 fragments within the 

kidney on the left side.  There is mild left hydronephrosis and mild left 

hydroureter.


Prescriptions: 


Hydrocodone/APAP 5/325 [Norco 5/325 mg***] 1 each PO Q6H PRN PRN #10 tablet MDD 

4


 PRN Reason: Pain


Ciprofloxacin [Cipro 500 MG***] 500 mg PO BID #14 tablet

## 2025-02-20 NOTE — XRAY
Indication: Left flank pain.  Status post renal stone and ureteral stent

removal.



Multiple contiguous axial images obtained through the abdomen and pelvis

without contrast using renal stone protocol.



Comparison: February 1, 2025



Lung bases clear.  Heart not enlarged.



Previous left UPJ calculus now seen left lower renal calyx fragmented into at

least 3 calculi, largest 8mm presumed from lithotripsy.  Minimal left

hydronephrosis and hydroureter presumed from recent obstructive uropathy.

Also new air bubbles left renal calyx and urinary bladder presumed iatrogenic.

 No free fluid/air.



Noncontrasted stomach and bowel loops nonobstructed.  Again fatty liver.

Remaining liver, gallbladder, pancreas, spleen, adrenal glands, right kidney,

right ureter, uterus, and aorta are unremarkable for noncontrast exam.



Impression:

1.  Fragmented left renal micro-calculi as detailed presumed from lithotripsy.

 Minimal left hydronephrosis/hydroureter.  Also new left renal and urinary

bladder air bubbles presumed iatrogenic.  Gas-forming bacterial infection not

completely excluded.

2.  Again fatty liver.